# Patient Record
Sex: FEMALE | Race: WHITE | Employment: STUDENT | URBAN - METROPOLITAN AREA
[De-identification: names, ages, dates, MRNs, and addresses within clinical notes are randomized per-mention and may not be internally consistent; named-entity substitution may affect disease eponyms.]

---
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
## 2023-02-06 ENCOUNTER — OFFICE VISIT (OUTPATIENT)
Dept: ORTHOPEDIC SURGERY | Age: 23
End: 2023-02-06
Payer: COMMERCIAL

## 2023-02-06 VITALS — BODY MASS INDEX: 20.4 KG/M2 | HEIGHT: 67 IN | RESPIRATION RATE: 16 BRPM | WEIGHT: 130 LBS

## 2023-02-06 DIAGNOSIS — M25.521 RIGHT ELBOW PAIN: Primary | ICD-10-CM

## 2023-02-06 PROCEDURE — 99202 OFFICE O/P NEW SF 15 MIN: CPT | Performed by: PHYSICIAN ASSISTANT

## 2023-02-06 NOTE — PROGRESS NOTES
Subjective:      Patient ID: Judit Dye is a 23 y.o. female who presents to the office for an initial evaluation of right elbow pain/ clicking and catching. Onset 2 days ago. No history of trauma. She does a lot of repetitive activity using her upper extremities while working at [employer]. She states her symptoms are worse first thing in the morning. Today she states the elbow feels pretty good. She states when she extends her elbow and rotates her wrist she can feel a catching or clicking with episodes of sharp pain. Pain Scale when painful is 7/10 VAS. Location of pain right elbow over the olecranon region. Pain is worse with full extension, getting up in the morning. Pain improves with change in position. Previous treatments have included Tylenol. Review of Systems:  I have reviewed the clinically relevant past medical history, medications, allergies, family history, social history, and 13 point Review of Systems from the patient's recent history form & documented any details relevant to today's presenting complaints in the history above. The patient's self-reported past medical history, medications, allergies, family history, social history, and Review of Systems form from today's date have been scanned into the chart under the \"Media\" tab. As noted in the HPI. Negative for fever or chills. Negative for poly-joint pain, swelling and stiffness. Negative for numbness or tingling. History reviewed. No pertinent past medical history. History reviewed. No pertinent family history. History reviewed. No pertinent surgical history.     Social History     Occupational History    Occupation: student   Tobacco Use    Smoking status: Never    Smokeless tobacco: Never   Substance and Sexual Activity    Alcohol use: No     Alcohol/week: 0.0 standard drinks    Drug use: No    Sexual activity: Not on file       Current Outpatient Medications   Medication Sig Dispense Refill    diclofenac (VOLTAREN) 50 MG EC tablet Take 1 tablet by mouth 2 times daily (with meals) 60 tablet 0     No current facility-administered medications for this visit. Objective:     She is alert, oriented x 3, pleasant, well nourished, developed and in no   acute distress. Resp 16   Ht 5' 7\" (1.702 m)   Wt 130 lb (59 kg)   BMI 20.36 kg/m²      Examination right elbow:  No obvious deformity, soft tissue swelling or erythema. Full extension noted. Full flexion noted. Full pronation and supination noted. No pain with range of motion testing. No instability noted. Nontender over the medial or lateral epicondyle. No joint effusion noted. Upper extremities:     Radial, medial, ulnar nerves intact. Examination of the upper extremities shows intact perfusion to all extremities. She has no cyanosis and digigts are warm to touch, capillary refill is less than 2 seconds. She has no edema noted. She has intact skin without lacerations or abrasions, no significant erythema, rashes or skin lesions. X Rays: were performed in the office today:   AP, lateral and oblique x-ray of the right elbow negative for fracture, loose body or bony abnormalities. Additional Tests reviewed: none  Additional Outside Records reviewed: none    Diagnosis:       ICD-10-CM    1. Right elbow pain  M25.521 XR ELBOW RIGHT (MIN 3 VIEWS)           Assessment and Plan:       Assessment:  Right elbow pain x2 days in a 33-year-old female, atraumatic in nature. May have some mild synovitis, could have a loose body or possible fibrocartilage tear. I cannot elicit her symptoms today. I had an extensive discussion with Ms. Pelon De La Garza regarding the natural history, etiology, and long term consequences of her condition. I have presented reasonable alternatives to the patient's proposed care, treatment, and services.   Risks and benefits of the treatment options also reviewed in detail. I have outlined a treatment plan with them. She has had full opportunity to ask her questions. I have answered them all to her satisfaction. I feel that Ms. Cj Duncan understands our discussion today. Plan:  Medications-   Diclofenac 50 mg twice daily x 2 weeks. NSAIDS discussed. The most common side effects from NSAIDs are stomachaches, heartburn, and nausea. NSAIDs may irritate the stomach lining. If the medicine upsets your stomach, you can try taking it with food. But if that doesn't help, talk with your doctor to make sure it's not a more serious problem, such as a stomach ulcer or bleeding in the stomach or intestines. Using NSAIDs may:  Lead to high blood pressure. Make symptoms of heart failure worse. Raise the risk of heart attack, stroke, kidney damage, and skin reactions. Your risks are greater if you take NSAIDs at higher doses or for longer than the label says. People who are older than 72 or who have heart, stomach, or intestinal disease have a higher risk for problems. PT- Ice instructed. A home exercise program was instructed today including ROM exercises and strengthening exercises. The patient verbalized understanding of these exercises as well as the importance of the exercise program to promote return of normal function. If pain intensifies or other problems arise you are to notify the office. Further Imaging-consider MRI of the right elbow with arthrogram if symptoms persist.    Follow up-she will notify the office if symptoms continue past 2 weeks. Call or return to clinic if these symptoms worsen or fail to improve as anticipated. Anselmo Arboleda PA-C   Senior Physician Assistant   [facility]                                         Disclaimer: This note was generated with use of a verbal recognition program (DRAGON) and an attempt was made to check for errors.   It is possible that there are still dictated errors within this office note. If so, please bring any significant errors to my attention for an addendum. All efforts were made to ensure that this office note is accurate.

## 2024-05-14 ENCOUNTER — OFFICE VISIT (OUTPATIENT)
Dept: ORTHOPEDIC SURGERY | Age: 24
End: 2024-05-14
Payer: COMMERCIAL

## 2024-05-14 VITALS — WEIGHT: 130 LBS | BODY MASS INDEX: 20.4 KG/M2 | HEIGHT: 67 IN

## 2024-05-14 DIAGNOSIS — M53.3 SI (SACROILIAC) JOINT DYSFUNCTION: ICD-10-CM

## 2024-05-14 DIAGNOSIS — M79.605 LOW BACK PAIN RADIATING TO LEFT LEG: Primary | ICD-10-CM

## 2024-05-14 DIAGNOSIS — M54.50 LOW BACK PAIN RADIATING TO LEFT LEG: Primary | ICD-10-CM

## 2024-05-14 PROCEDURE — 99203 OFFICE O/P NEW LOW 30 MIN: CPT | Performed by: PHYSICIAN ASSISTANT

## 2024-05-14 RX ORDER — MELOXICAM 15 MG/1
15 TABLET ORAL DAILY
Qty: 30 TABLET | Refills: 1 | Status: SHIPPED | OUTPATIENT
Start: 2024-05-14

## 2024-05-14 RX ORDER — TIZANIDINE 2 MG/1
2 TABLET ORAL 4 TIMES DAILY PRN
Qty: 60 TABLET | Refills: 0 | Status: SHIPPED | OUTPATIENT
Start: 2024-05-14

## 2024-05-14 NOTE — PROGRESS NOTES
New Patient: LUMBAR SPINE      Referring Provider:      CHIEF COMPLAINT:    Chief Complaint   Patient presents with    Back Pain       HISTORY OF PRESENT ILLNESS:    Ms. Janna Dennis  is a pleasant 23 y.o. female here for consultation regarding her LBP and left leg pain. She states her pain began gradually about 1 year ago. Her pain has steadily worsened since then. She rates her back pain 7/10 VAS and leg pain 5/10 VAS. She describes the pain as sharp pain in the AM that is worse with running, rising from a seated position, leaning forward and lying down and better with sitting, standing, walking, walking while leaning on a grocery cart. The leg pain radiates down the posterior aspect of her leg to her posterior thigh and calf. She denies numbness and tingling in her lower legs.  Shereports weakness of herleft leg and denies bowel or bladder dysfunction. She states she can sit for a maximum of 60 minutes and stand for a maximum unlimited minutes. The pain does disrupts her sleep.     Current/Past Treatment:   Physical Therapy: No  Chiropractic:  No   Injection:  No   Medications:          NSAIDS:  Diclofenac 50 mg        Muscle relaxer:  NONE        Steroids:  NONE  Neuropathic medications:  NONE  Opioids: NONE  Other: NONE  Surgery/Consult NONE    Past Medical History:   History reviewed. No pertinent past medical history.   Past Surgical History:     History reviewed. No pertinent surgical history.  Current Medications:     Current Outpatient Medications:     tiZANidine (ZANAFLEX) 2 MG tablet, Take 1 tablet by mouth 4 times daily as needed (spasm), Disp: 60 tablet, Rfl: 0    meloxicam (MOBIC) 15 MG tablet, Take 1 tablet by mouth daily, Disp: 30 tablet, Rfl: 1  Allergies:  Patient has no known allergies.  Social History:    reports that she has never smoked. She has never used smokeless tobacco. She reports that she does not drink alcohol and does not use drugs.  Family History:   History reviewed. No